# Patient Record
Sex: MALE | Race: WHITE | Employment: OTHER | URBAN - METROPOLITAN AREA
[De-identification: names, ages, dates, MRNs, and addresses within clinical notes are randomized per-mention and may not be internally consistent; named-entity substitution may affect disease eponyms.]

---

## 2017-01-01 ENCOUNTER — TELEPHONE (OUTPATIENT)
Dept: INTERNAL MEDICINE CLINIC | Facility: CLINIC | Age: 72
End: 2017-01-01

## 2017-01-01 ENCOUNTER — HOSPITAL ENCOUNTER (OUTPATIENT)
Dept: CV DIAGNOSTICS | Facility: HOSPITAL | Age: 72
Discharge: HOME | End: 2017-01-01
Attending: INTERNAL MEDICINE
Payer: MEDICARE

## 2017-01-01 ENCOUNTER — HOSPITAL ENCOUNTER (OUTPATIENT)
Dept: ULTRASOUND IMAGING | Age: 72
Discharge: HOME | End: 2017-01-01
Attending: INTERNAL MEDICINE
Payer: MEDICARE

## 2017-01-01 ENCOUNTER — OFFICE VISIT (OUTPATIENT)
Dept: INTERNAL MEDICINE CLINIC | Facility: CLINIC | Age: 72
End: 2017-01-01

## 2017-01-01 ENCOUNTER — MED REC SCAN ONLY (OUTPATIENT)
Dept: INTERNAL MEDICINE CLINIC | Facility: CLINIC | Age: 72
End: 2017-01-01

## 2017-01-01 ENCOUNTER — APPOINTMENT (OUTPATIENT)
Dept: LAB | Age: 72
End: 2017-01-01
Attending: INTERNAL MEDICINE
Payer: MEDICARE

## 2017-01-01 VITALS
DIASTOLIC BLOOD PRESSURE: 80 MMHG | OXYGEN SATURATION: 98 % | BODY MASS INDEX: 27.23 KG/M2 | RESPIRATION RATE: 16 BRPM | HEART RATE: 69 BPM | SYSTOLIC BLOOD PRESSURE: 120 MMHG | HEIGHT: 75 IN | TEMPERATURE: 98 F | WEIGHT: 219 LBS

## 2017-01-01 VITALS
WEIGHT: 223 LBS | RESPIRATION RATE: 16 BRPM | TEMPERATURE: 98 F | BODY MASS INDEX: 27.73 KG/M2 | HEART RATE: 71 BPM | DIASTOLIC BLOOD PRESSURE: 84 MMHG | HEIGHT: 75 IN | SYSTOLIC BLOOD PRESSURE: 138 MMHG | OXYGEN SATURATION: 98 %

## 2017-01-01 VITALS
HEART RATE: 72 BPM | WEIGHT: 216 LBS | HEIGHT: 75 IN | DIASTOLIC BLOOD PRESSURE: 80 MMHG | TEMPERATURE: 99 F | RESPIRATION RATE: 12 BRPM | SYSTOLIC BLOOD PRESSURE: 128 MMHG | BODY MASS INDEX: 26.86 KG/M2 | OXYGEN SATURATION: 95 %

## 2017-01-01 DIAGNOSIS — R94.31 ABNORMAL EKG: ICD-10-CM

## 2017-01-01 DIAGNOSIS — I10 ESSENTIAL HYPERTENSION: ICD-10-CM

## 2017-01-01 DIAGNOSIS — H25.092 AGE-RELATED INCIPIENT CATARACT OF LEFT EYE: Primary | ICD-10-CM

## 2017-01-01 DIAGNOSIS — E78.5 HYPERLIPIDEMIA WITH TARGET LDL LESS THAN 100: ICD-10-CM

## 2017-01-01 DIAGNOSIS — Z13.6 ENCOUNTER FOR ABDOMINAL AORTIC ANEURYSM (AAA) SCREENING: ICD-10-CM

## 2017-01-01 DIAGNOSIS — M79.673 PAIN OF FOOT, UNSPECIFIED LATERALITY: Primary | ICD-10-CM

## 2017-01-01 DIAGNOSIS — Z13.6 SCREENING FOR CARDIOVASCULAR CONDITION: Primary | ICD-10-CM

## 2017-01-01 DIAGNOSIS — Z01.810 PREOP CARDIOVASCULAR EXAM: ICD-10-CM

## 2017-01-01 DIAGNOSIS — R68.89 FLU-LIKE SYMPTOMS: Primary | ICD-10-CM

## 2017-01-01 DIAGNOSIS — Z86.73 HISTORY OF STROKE: ICD-10-CM

## 2017-01-01 DIAGNOSIS — E78.2 MIXED HYPERLIPIDEMIA: ICD-10-CM

## 2017-01-01 DIAGNOSIS — I10 ESSENTIAL HYPERTENSION: Primary | ICD-10-CM

## 2017-01-01 DIAGNOSIS — I10 HTN, GOAL BELOW 140/80: ICD-10-CM

## 2017-01-01 LAB
ALBUMIN SERPL-MCNC: 3.8 G/DL (ref 3.5–4.8)
ALP LIVER SERPL-CCNC: 80 U/L (ref 45–117)
ALT SERPL-CCNC: 19 U/L (ref 17–63)
AST SERPL-CCNC: 14 U/L (ref 15–41)
BILIRUB SERPL-MCNC: 0.7 MG/DL (ref 0.1–2)
BILIRUB UR QL STRIP.AUTO: NEGATIVE
BUN BLD-MCNC: 16 MG/DL (ref 8–20)
CALCIUM BLD-MCNC: 9 MG/DL (ref 8.3–10.3)
CHLORIDE: 108 MMOL/L (ref 101–111)
CHOLEST SMN-MCNC: 161 MG/DL (ref ?–200)
CLARITY UR REFRACT.AUTO: CLEAR
CO2: 26 MMOL/L (ref 22–32)
COLOR UR AUTO: YELLOW
CREAT BLD-MCNC: 1.2 MG/DL (ref 0.7–1.3)
GLUCOSE BLD-MCNC: 103 MG/DL (ref 70–99)
GLUCOSE UR STRIP.AUTO-MCNC: NEGATIVE MG/DL
HDLC SERPL-MCNC: 67 MG/DL (ref 45–?)
HDLC SERPL: 2.4 {RATIO} (ref ?–4.97)
KETONES UR STRIP.AUTO-MCNC: NEGATIVE MG/DL
LDLC SERPL CALC-MCNC: 80 MG/DL (ref ?–130)
LEUKOCYTE ESTERASE UR QL STRIP.AUTO: NEGATIVE
M PROTEIN MFR SERPL ELPH: 7 G/DL (ref 6.1–8.3)
NITRITE UR QL STRIP.AUTO: NEGATIVE
NONHDLC SERPL-MCNC: 94 MG/DL (ref ?–130)
PH UR STRIP.AUTO: 6 [PH] (ref 4.5–8)
POTASSIUM SERPL-SCNC: 3.8 MMOL/L (ref 3.6–5.1)
PROT UR STRIP.AUTO-MCNC: NEGATIVE MG/DL
RBC UR QL AUTO: NEGATIVE
SODIUM SERPL-SCNC: 143 MMOL/L (ref 136–144)
SP GR UR STRIP.AUTO: 1.02 (ref 1–1.03)
TRIGLYCERIDES: 70 MG/DL (ref ?–150)
UROBILINOGEN UR STRIP.AUTO-MCNC: <2 MG/DL
VLDL: 14 MG/DL (ref 5–40)

## 2017-01-01 PROCEDURE — 80061 LIPID PANEL: CPT

## 2017-01-01 PROCEDURE — 93000 ELECTROCARDIOGRAM COMPLETE: CPT | Performed by: INTERNAL MEDICINE

## 2017-01-01 PROCEDURE — 36415 COLL VENOUS BLD VENIPUNCTURE: CPT

## 2017-01-01 PROCEDURE — 80053 COMPREHEN METABOLIC PANEL: CPT

## 2017-01-01 PROCEDURE — 87502 INFLUENZA DNA AMP PROBE: CPT | Performed by: PHYSICIAN ASSISTANT

## 2017-01-01 PROCEDURE — 93017 CV STRESS TEST TRACING ONLY: CPT

## 2017-01-01 PROCEDURE — 76706 US ABDL AORTA SCREEN AAA: CPT

## 2017-01-01 PROCEDURE — 99214 OFFICE O/P EST MOD 30 MIN: CPT | Performed by: INTERNAL MEDICINE

## 2017-01-01 PROCEDURE — 93018 CV STRESS TEST I&R ONLY: CPT | Performed by: INTERNAL MEDICINE

## 2017-01-01 PROCEDURE — 78452 HT MUSCLE IMAGE SPECT MULT: CPT

## 2017-01-01 PROCEDURE — 81003 URINALYSIS AUTO W/O SCOPE: CPT

## 2017-01-01 PROCEDURE — 99213 OFFICE O/P EST LOW 20 MIN: CPT | Performed by: PHYSICIAN ASSISTANT

## 2017-01-01 PROCEDURE — 87798 DETECT AGENT NOS DNA AMP: CPT | Performed by: PHYSICIAN ASSISTANT

## 2017-01-01 PROCEDURE — 78452 HT MUSCLE IMAGE SPECT MULT: CPT | Performed by: INTERNAL MEDICINE

## 2017-01-01 RX ORDER — ATORVASTATIN CALCIUM 40 MG/1
40 TABLET, FILM COATED ORAL DAILY
Qty: 90 TABLET | Refills: 1 | Status: SHIPPED | OUTPATIENT
Start: 2017-01-01

## 2017-01-01 RX ORDER — OSELTAMIVIR PHOSPHATE 75 MG/1
75 CAPSULE ORAL 2 TIMES DAILY
Qty: 10 CAPSULE | Refills: 0 | Status: SHIPPED | OUTPATIENT
Start: 2017-01-01 | End: 2017-01-01

## 2017-02-17 PROBLEM — E78.2 MIXED HYPERLIPIDEMIA: Status: ACTIVE | Noted: 2017-01-01

## 2017-02-17 PROBLEM — I10 ESSENTIAL HYPERTENSION: Status: ACTIVE | Noted: 2017-01-01

## 2017-02-17 NOTE — PATIENT INSTRUCTIONS
Facts About Dietary Fat     Olive oil is a good source of unsaturated fat. Eating less saturated and trans fat is one of the best things you can do for your heart. Start by finding out which fats are better to use.  Then always try to use as little \" © 6143-1878 [address], [address] [place]. All rights reserved. This information is not intended as a substitute for professional medical care. Always follow your healthcare professional's instructions.

## 2017-02-17 NOTE — PROGRESS NOTES
HPI:    Patient ID: Marquita Comer is a 66year old male. Hypertension  This is a chronic problem. The current episode started more than 1 year ago. The problem is controlled.  Pertinent negatives include no anxiety, blurred vision, chest pain, headach Neurological: Negative for focal weakness and headaches. All other systems reviewed and are negative. Current Outpatient Prescriptions:  Atorvastatin Calcium 40 MG Oral Tab Take 1 tablet (40 mg total) by mouth daily.  Disp: 90 tablet Rfl: 1 1      Sig: Take 1 tablet (40 mg total) by mouth daily.            Imaging & Referrals:  US ABDOMINAL AORTIC ANEURYSM SCREENING (CPT=76706)       FI#[id number]

## 2017-02-20 NOTE — TELEPHONE ENCOUNTER
Please call Daughter. Daughter is in town and was wanting to know if her father needed any scripts renewed. She also needs a name of a heart dr so she can get a test setup for her father to get done.

## 2017-02-23 PROBLEM — I77.811 ABDOMINAL AORTIC ECTASIA (HCC): Status: ACTIVE | Noted: 2017-01-01

## 2017-02-28 NOTE — TELEPHONE ENCOUNTER
Pt's daughter, Teetee Buckley, called to find out test results from pt's stomach ultrasound (dtr on [facility]).

## 2017-03-22 NOTE — PROGRESS NOTES
HPI:   Gifty Hernandez. is a 66year old male who presents for upper respiratory symptoms for  4  days. Patient reports shortness of breath, fatigue, dry cough, body aches/chills/sweats and sob.  Patient denies wheezing, rash, sinus pressure, thick sputum, light reflex, oropharynx without erythema, exudates or tonsillar hypertrophy  NECK: supple, no adenopathy, FROM  LUNGS: clear to auscultation b/l  CARDIO: RRR without murmur rub or gallop  GI: soft, non-distended and nontender, no CVA tenderness    ASSESSM

## 2017-03-24 NOTE — TELEPHONE ENCOUNTER
----- Message from Sandie Bhardwaj MD sent at 3/23/2017  9:04 AM CDT -----  Neg for influenza.  Stop tamiflu  sxs likely due to other viral infection that is self limiting

## 2017-03-24 NOTE — TELEPHONE ENCOUNTER
Pt was informed of the results and will stop taking the Tamiflu. Pt states the cough is still present but has not worsened. The pt will continue to rest and push fluids. The pt was instructed to contact the office if the cough symptoms worsen.

## 2017-03-30 NOTE — TELEPHONE ENCOUNTER
Called Dr. Benjamín Ace office to ask if patient nees anything other than H&P for appointment on 4/10/17. Per [facility], patient is having surgery April 17th for cataract repair under IV sedation.  He needs a BMP, EKG and H&P with medical clearance faxed to [phone]

## 2017-03-30 NOTE — TELEPHONE ENCOUNTER
Pt's daughter, Wyatt Novak, called to ask if the pt will need to do any fasting labs as part of his H&P prior to surgery? Call her at [phone].

## 2017-04-10 NOTE — TELEPHONE ENCOUNTER
Reviewed at great length what Dr. Sharyle Argue recommendations were during today's appointment. The pt's lungs were clear. There was no need for a chest x-ray or additional mediations. This should resolve on its own and is due to the pt's recent URI.      The

## 2017-04-10 NOTE — TELEPHONE ENCOUNTER
Pt's daughter, Precious Jauregui, wants to speak w/ a nurse to discuss what Dr Margaux Whitney went over during the pre op appt and what steps are needed going forward

## 2017-04-10 NOTE — PATIENT INSTRUCTIONS
Heart Disease Education    The heart beats 60 to 100 times per minute, 24 hours a day. This equals almost 1000,000 times a day. It pumps blood with oxygen and nutrients to the tissues and organs of the body.  But the heart is a muscle and needs its own astorga · Stable angina usually occurs with a predictable level of activity. Being stable, its character, severity, and occurrence do not change much over time.  It usually starts with activity, and resolves with rest or taking your medicine as instructed by your d · Unexplained weakness  You may not be able to tell the difference between \"bad\" angina and a heart attack at home. Seek help if your symptoms are different than usual. Do not be in denial or just try to \"tough it out. \"  Call 911  This is the fastest a · Do not delay. Fast diagnosis and treatment can prevent or limit the amount of heart damage during a heart attack.   · Do not go to your doctor's office or a clinic as they may not be able to provide all the testing and treatment required for this conditio · Use of stimulant drugs such as cocaine, “crack,” and amphetamines  · Eating a high-fat, high-cholesterol meal  · Diabetes   · Obesity which increases risk for diabetes and high blood pressure  · Lack of regular physical activity     Examples of emotional

## 2017-04-10 NOTE — TELEPHONE ENCOUNTER
Reviewed with the pt's daugther the appointment is scheduled at 1:45 for today. An EKG will be completed and a BMP will be ordered. Discussed the fasting for this test would be 4 hours prior. Daughter will update the pt on this.      The daughter also repor

## 2017-04-11 NOTE — H&P
[facility]    History and Physical    Allen SOUSA [employer]. Patient Status:  No patient class for patient encounter    10/16/1945 MRN [id number]   Location [facility] Attending No att.  pro Occ: retired. : yes. Children: yes.    Exercise: minimal.  Diet: watches minimally     REVIEW OF SYSTEMS:   GENERAL: feels well otherwise  SKIN: denies any unusual skin lesions  EYES:denies blurred vision or double vision  HEENT: denies nasal office showed ST depression in anterior leads. He will require further cardiovascular testing in the form of a lexiscan (patient is unable to walk due to right knee pain from OA).  If his stress test is negative an addendum will be made to this note to linda Assessment/Plan:     * No active hospital problems.  *      [unfilled]      Kurtis Walton MD  4/10/2017

## 2017-04-14 NOTE — TELEPHONE ENCOUNTER
----- Message from Tiffany Ulloa MD sent at 4/13/2017  1:39 PM CDT -----  Stress test is normal. Cleared for surgery

## 2017-05-22 ENCOUNTER — TELEPHONE (OUTPATIENT)
Dept: INTERNAL MEDICINE CLINIC | Facility: CLINIC | Age: 72
End: 2017-05-22

## 2017-05-22 NOTE — TELEPHONE ENCOUNTER
Received a death certificate fax from [address] [place] [address] stating patient passed on 05/18/2017. Form faxed back to [phone], and fax confirmation received.

## 2017-05-24 ENCOUNTER — TELEPHONE (OUTPATIENT)
Dept: INTERNAL MEDICINE CLINIC | Facility: CLINIC | Age: 72
End: 2017-05-24

## 2017-09-27 ENCOUNTER — TELEPHONE (OUTPATIENT)
Dept: INTERNAL MEDICINE CLINIC | Facility: CLINIC | Age: 72
End: 2017-09-27

## 2020-08-10 NOTE — MR AVS SNAPSHOT
[address]  [address] [facility] [id number] [phone]               Thank you for choosing us for your health care visit with Darryl Golden PA-C.   We are glad to serve you and happy to provide you with Spoke w/pt, she states this started Wed and she did have rectal bleeding that one day but none since. Pt denies any other symptoms, no severe pain, no fever. Pt states she hasnt had a flare up in years.   Reason for Today's Visit     Cough           Medical Issues Discussed Today     Flu-like symptoms    -  Primary      Instructions and Information about Your Health    Start tamiflu twice daily and monitor symptoms       Allergies as of Mar 22, 2017     No For medical emergencies, dial 911.            Visit [facility] online at  [email or web address]
